# Patient Record
(demographics unavailable — no encounter records)

---

## 2024-10-29 NOTE — PHYSICAL EXAM
[Respiratory Effort] : normal respiratory effort [Normal Rate and Rhythm] : normal rate and rhythm [Alert] : alert [Oriented to Person] : oriented to person [Oriented to Place] : oriented to place [Oriented to Time] : oriented to time [Calm] : calm [JVD] : no jugular venous distention  [de-identified] : well, NAD [de-identified] : NCAT no scleral icterus [de-identified] : soft, neg guillen's no ttp. non distended. possible tiny umbilical hernia [de-identified] : no c/c/e

## 2024-10-29 NOTE — PLAN
[FreeTextEntry1] : He will be scheduled at his convenience for laparoscopic robotic assisted cholecystectomy.  Based on his labs and evaluation from  he does not need additional workup.  We will obtain the primary care and cardiology notes from [facility] as he is already seen all of these providers and per the patient the workup was negative outside of the gallstones.

## 2024-10-29 NOTE — ASSESSMENT
[FreeTextEntry1] : Patient is a very pleasant 35-year-old man who has signs and symptoms consistent with subacute/chronic cholecystitis.  He has a large gallstone that is likely intermittently obstructing.  His symptoms are consistent with biliary etiology.  We did discuss risks and benefits of observation versus cholecystectomy.  We discussed that there can be times where there is also some underlying mild gastritis and in some cases this would mean that not all of the symptoms do resolve after cholecystectomy.  We discussed the typical perioperative and postop course for cholecystectomy.  We discussed the most common risks and the rare risks including but not limiting to infection bleeding hernia bile leak retained stone and injury to bile duct.  All of his questions were answered.

## 2024-10-29 NOTE — HISTORY OF PRESENT ILLNESS
[de-identified] : The patient is a very pleasant 35-year-old man who presents for evaluation of right upper quadrant pain and gallstones.  He reports that for the past 3 to 4 weeks he has been having some ongoing episodes of right upper quadrant abdominal pain after meals.  It typically starts about an hour after eating a higher fat or heavier meal and last for few hours before stopping.  He reports that he has not had a severe episode or any nausea vomiting or fevers or chills.  However he does have some exacerbation of it after eating certain foods including pizza this past weekend.  He tried Pepto-Bismol without any improvement.  He has not taken anything else for the pain.  He is otherwise feeling well and in his usual state of health

## 2024-11-19 NOTE — HISTORY OF PRESENT ILLNESS
[de-identified] : Mr. Hampton presents today for a postoperative visit 2 weeks s/p laparoscopic cholecystectomy on 11/6/24. Patient reports he is doing well. Denies severe pain, n/v, fevers, chest pain, sob or PO intolerance. Since being more active, he is feeling more discomfort on left abdomen but not necessitating pain medication. We expect discomfort to continue improving with time as he continues to recover. Tolerating a regular diet well with no difficulty. Passing gas and having regular bowel movements. Reported some trouble with urination likely secondary to anesthetic agents used during surgery, we also expect this to improve and resolve shortly. Resuming regular activities as tolerated.

## 2024-11-19 NOTE — PHYSICAL EXAM
[No HSM] : no hepatosplenomegaly [No Rash or Lesion] : No rash or lesion [Alert] : alert [Oriented to Person] : oriented to person [Oriented to Place] : oriented to place [Oriented to Time] : oriented to time [Calm] : calm [de-identified] : well appearing, in no acute distress [de-identified] : 4 healing lap sites with dermabond, no signs of infection [de-identified] : wnl

## 2024-11-19 NOTE — ASSESSMENT
[FreeTextEntry1] : 35 year old male having a normal postoperative course 2 weeks s/p lap cholecystectomy. Recovering well with no issues, we expect postoperative discomfort and urination to improve with recovery. Contact our office if not improving or worsening symptoms. Advised he may continue taking NSAIDs as needed for discomfort. May advance physical activity as tolerated but refrain from heavy lifting for three weeks postop. Otherwise, follow up as needed.

## 2024-11-19 NOTE — REASON FOR VISIT
[Post Op: _________] : a [unfilled] post op visit [FreeTextEntry1] : laparoscopic cholecystectomy on 11/6/24